# Patient Record
Sex: FEMALE | Race: BLACK OR AFRICAN AMERICAN | Employment: STUDENT | URBAN - METROPOLITAN AREA
[De-identification: names, ages, dates, MRNs, and addresses within clinical notes are randomized per-mention and may not be internally consistent; named-entity substitution may affect disease eponyms.]

---

## 2019-06-10 ENCOUNTER — HOSPITAL ENCOUNTER (OUTPATIENT)
Dept: NUTRITION | Age: 54
Discharge: HOME | End: 2019-06-10
Payer: COMMERCIAL

## 2019-06-10 PROCEDURE — 97802 MEDICAL NUTRITION INDIV IN: CPT

## 2019-06-10 NOTE — PROGRESS NOTES
[address] [place] [address] 51, 45 [facility], [place], 70 [facility]  Phone: [phone]  Fax: [phone]   Nutrition Assessment  Medical Nutrition Therapy   Outpatient Initial Evaluation         Patient Name: Carmita Garcia : 1965   Treatment Diagnosis: diabetes   Referral Source: Marbin Daly* Start of Care [facility]): 6/10/2019     Gender: female Age: 48 y.o. Ht: 70 In Wt: 198.4 lb  kg   BMI: 28.5 BMR   Male  BMR Female 1585     Past Medical History includes: HLD; overweight     Pertinent Medications:   Metformin 750 mg     Biochemical Data:   A1c 8.2; ;      Subjective/Assessment:   Pt stated she was diagnosed with diabetes 4 mos ago (A1c 8.2). She was upset and confused about this diagnosis, since she was never diagnosed with pre diabetes and stated she has been up to date with her yearly routine examinations. Pt also expressed frustration that her efforts to improve her diet and lifestyle were not reflected in f/u labs (A1c increased). Her goal is to avoid taking any medication and to control BGs through diet and exercise. Pt is highly motivated and willing to make the necessary and recommended changes in her lifestyle. Pt works for the [employer] and lives alone, although she has grown children nearby. She stated she was very athletic growing up, but lost interest in exercise while she was going through menopause. Pt showed interest in SMBG and in the CGM to help her better monitor and therefore manage BGs. She is to f/u with PCP for labs the end of July. Current Eating Patterns: Pt stated she recently cut carbs out of her diet, but her dietary recall did not reflect a low carb diet. She c/o increased cravings for sugar. Pt stated she follows a \"healthy\" diet Mon-Thurs, but splurges on the weekends.   Her diet is very inconsistent and she is likely not consuming enough protein to keep her satiated, leading to overeating later in the day and over the weekends. Estimate Needs   Calories: 1400  Protein: 105 Carbs: 140 Fat: 47   Kcal/day  g/day  g/day  g/day        percent: 30  40  30               Education & Recommendations provided: Educated pt on the pathophysiology of Type II Diabetes, insulin resistance and the rationale for dietary modifications and increased activity. Educated pt macronutrient composition of various foods and provided specific recommendations for intake at each scheduled meal and snack. Also discussed the importance of establishing a consistent lifestyle and eating schedule to promote a more efficient metabolism. Handouts Provided: []  Carbohydrates  []  Protein  []  Fiber  []  Serving Sizes  []  Meal and Snack Ideas  []  Food Journals []  Diabetes  []  Cholesterol  []  Sodium  [x]  Meal Builder  [x]  Diet Plan  []  Others:   Information Reviewed with: pt   Readiness to Change Stage: []  Pre-contemplative    []  Contemplative  []  Preparation               [x]  Action                  []  Maintenance   Potential Barriers to Learning: []  Decline in memory    []  Language barrier   []  Other:  []  Emotional                  []  Limited mobility  []  Lack of motivation     [] Vision, hearing or cognitive impairment   Expected Compliance: Good     Nutritional Goal - To promote lifestyle changes to result in:    [x]  Weight loss  [x]  Improved diabetic control  [x]  Decreased cholesterol levels  []  Decreased blood pressure  []  Weight maintenance []  Preventing any interactions associated with food allergies  []  Adequate weight gain toward goal weight  []  Other:        Patient Goals:  SMART goals 1. Always combine and balance carbohydrate and protein        2. Eat 2-3 hrs after snacks and 4-5 hrs after meals  3.  Avoid all added sugars     Dietitian Signature: Mai Birmingham MS, RD, CSSD Date: 6/10/2019   Follow-up: 7/1 at 4:00 Time: 5:16 PM

## 2019-07-01 ENCOUNTER — HOSPITAL ENCOUNTER (OUTPATIENT)
Dept: NUTRITION | Age: 54
Discharge: HOME | End: 2019-07-01
Payer: COMMERCIAL

## 2019-07-01 PROCEDURE — 97803 MED NUTRITION INDIV SUBSEQ: CPT

## 2019-07-01 NOTE — PROGRESS NOTES
NUTRITION  FOLLOW-UP TREATMENT NOTE  Patient Name: Jorge Luis Carter         Date: 2019  : 1965    YES Patient  Verified  Diagnosis: diabetes   In time:   400             Out time:   430   Total Treatment Time (min):   30     SUBJECTIVE/ASSESSMENT    Changes in medication or medical history? Any new allergies, surgeries or procedures?    /NO    If yes, update Summary List           Current Wt: 198.8 Previous Wt: 198.4 Wt Change: +0.4     Achievement of Goals: Pt admitted her diet is not consistent 7 days/wk. She is usually on track M-F, but then rewards herself (with food) over the weekends. Pt's diet recall indicates her overall calorie intake is lower than recommended, as well as her carb intake. She remains fearful of carbohydrates/sugar. Spent most of the appointment discussing the macronutrients and their function in the body. Explained the difference between sugar and carbohydrates and the importance of both. Pt is scheduled for f/u with labs and PCP the end of the month. Patient Education:  [x]  Review current plan with patient   []  Other:    Handouts/  Information Provided: []  Carbohydrates  []  Protein  []  Fiber  []  Serving Sizes  []  Fluids  []  General guidelines []  Diabetes  []  Cholesterol  []  Sodium  []  SBGM  []  Food Journals  []  Others:      New Patient Goals: 1. Measure all starch (after it's cooked); limit to 1/2 cup serving  2. Eat consistently 7 days/wk; keep a food journal; weigh yourself every Mon and Fri  3.  Break meals into smaller snacks, but eat more often, if needed     PLAN    [x]  Continue on current plan []  Follow-up PRN   []  Discharge due to :    [x]  Next appt:  at 4:00     Dietitian: Amparo Cloud MS, RD, CSSD    Date: 2019 Time: 5:41 PM

## 2019-08-30 ENCOUNTER — HOSPITAL ENCOUNTER (OUTPATIENT)
Dept: NUTRITION | Age: 54
Discharge: HOME | End: 2019-08-30
Payer: COMMERCIAL

## 2019-08-30 PROCEDURE — 97803 MED NUTRITION INDIV SUBSEQ: CPT

## 2019-08-30 NOTE — PROGRESS NOTES
NUTRITION  FOLLOW-UP TREATMENT NOTE  Patient Name: Trevor Phillips         Date: 2019  : 1965    YES Patient  Verified  Diagnosis: diabetes   In time:   100             Out time:   130   Total Treatment Time (min):   30     SUBJECTIVE/ASSESSMENT    Changes in medication or medical history? Any new allergies, surgeries or procedures?    /NO    If yes, update Summary List           Current Wt: 191.2 Previous Wt: 196.2 Wt Change: -5     Achievement of Goals: Pt stated she is still struggling to eat as much as recommended. Reminded pt that with consistency and repetition, her appetite will likely build at the recommended times. Also reinforced nutrition label reading and encouraged pt to choose higher calorie sources of carbs and protein which will allow her to eat smaller portions. Pt also struggling with sleep. PCP prescribed ambien, but pt became fearful of developing an addiction, so pt will try melatonin instead. Patient Education:  [x]  Review current plan with patient   []  Other:    Handouts/  Information Provided: []  Carbohydrates  []  Protein  []  Fiber  []  Serving Sizes  []  Fluids  []  General guidelines []  Diabetes  []  Cholesterol  []  Sodium  []  SBGM  []  Food Journals  []  Others:      New Patient Goals: 1. Balance 1 serving protein with 1 serving carb at each scheduled eating time  2. Use meal builder to plan meals/snacks using diet guidelines  3.  Be consistent all 7 days and keep a food log     PLAN    [x]  Continue on current plan []  Follow-up PRN   []  Discharge due to :    [x]  Next appt: Fri oct 4 at 1:00     Dietitian: Raeann Runner, MS, THOMAS, CSSRUPINDER    Date: 2019 Time: 2:33 PM

## 2019-10-15 ENCOUNTER — HOSPITAL ENCOUNTER (OUTPATIENT)
Dept: NUTRITION | Age: 54
Discharge: HOME | End: 2019-10-15
Payer: COMMERCIAL

## 2019-10-15 PROCEDURE — 97803 MED NUTRITION INDIV SUBSEQ: CPT

## 2019-10-21 NOTE — PROGRESS NOTES
NUTRITION  FOLLOW-UP TREATMENT NOTE  Patient Name: Kamilla Robertson         Date: 10/21/2019  : 1965    YES Patient  Verified  Diagnosis:diabetes; nutrition counseling   In time:   200             Out time:   230   Total Treatment Time (min):   30     SUBJECTIVE/ASSESSMENT    Changes in medication or medical history? Any new allergies, surgeries or procedures?    /NO    If yes, update Summary List    Pt in for follow up nutrition/weight loss counseling appt. Pt admitted she continues to feel overwhelmed with stress and sadness. She does not have the energy to meal plan or exercise, and has overindulged in salty snacks as a result of her stress. Pt is anxious to find a job outside of [employer]. Pt stated she no longer enjoys her job here and has no friends or family holding her here. Pt finally ended an 8 year relationship, which has also contributed to her stress. Encouraged pt to look into therapy/mental health counseling for support. Current Wt: 191 Previous Wt: 191.2 Wt Change: -0.2     Achievement of Goals: Pt has not met any of the previous goals. Patient Education:  [x]  Review current plan with patient   []  Other:    Handouts/  Information Provided: []  Carbohydrates  []  Protein  []  Fiber  []  Serving Sizes  []  Fluids  []  General guidelines []  Diabetes  []  Cholesterol  []  Sodium  []  SBGM  []  Food Journals  []  Others:      New Patient Goals: 1. Stay in control- meal plan and prep as necessary  2. Exercise 1d/wk  3.  Measure all starch servings at meals     PLAN    [x]  Continue on current plan []  Follow-up PRN   []  Discharge due to :    [x]  Next appt:  at 2     Dietitian: Bossman Lucas MS, RD, CSSD    Date: 10/21/2019 Time: 10:48 AM

## 2019-12-04 ENCOUNTER — HOSPITAL ENCOUNTER (OUTPATIENT)
Dept: NUTRITION | Age: 54
Discharge: HOME | End: 2019-12-04
Payer: COMMERCIAL

## 2019-12-04 PROCEDURE — 97803 MED NUTRITION INDIV SUBSEQ: CPT

## 2019-12-05 NOTE — PROGRESS NOTES
NUTRITION  FOLLOW-UP TREATMENT NOTE  Patient Name: Demetria Wilson         Date: 2019  : 1965    YES Patient  Verified  Diagnosis: diabetes; In time:   200             Out time:   230   Total Treatment Time (min):   30     SUBJECTIVE/ASSESSMENT    Changes in medication or medical history? Any new allergies, surgeries or procedures? YES/NO    If yes, update Summary List   Pt admitted she over-indulged over the Thanksgiving holiday. Pt rationalized her choices by saying \"I only get to eat this food once a year. \"  She does not appear overly motivated to reach her goals, but knows she needs to make changes in her choices and eating habits to support a healthier lifestyle. Encouraged pt to focus on BG management, rather than eating to lose weight. Pt asked to continue nutrition counseling for the extra accountability. Suggested pt request PCP writes an order for a glucometer to help manage BGs before and after meals. Current Wt: 191.6 Previous Wt: 191 Wt Change: +0.6     Achievement of Goals: 1. Stay in control- meal plan and prep as necessary- not met; cont  2. Exercise 1d/wk- not met; cont  3. Measure all starch servings at meals- not met; cont     Patient Education:  [x]  Review current plan with patient   []  Other:    Handouts/  Information Provided: []  Carbohydrates  []  Protein  []  Fiber  []  Serving Sizes  []  Fluids  []  General guidelines []  Diabetes  []  Cholesterol  []  Sodium  []  SBGM  []  Food Journals  []  Others:      New Patient Goals: 1. Avoid all sweetened beverages including mixed drinks  2. Make appropriate substitutions within each macronutrient  3. Stay in control; distract and delay to avoid impulsive eating  4.  Find yoga/pilates studio     PLAN    [x]  Continue on current plan []  Follow-up PRN   []  Discharge due to :    [x]  Next appt:  at 2     Dietitian: Selwyn Walton MS, RD, CSSD    Date: 2019 Time: 1:33 PM

## 2020-01-08 ENCOUNTER — HOSPITAL ENCOUNTER (OUTPATIENT)
Dept: NUTRITION | Age: 55
Discharge: HOME | End: 2020-01-08
Payer: COMMERCIAL

## 2020-01-08 PROCEDURE — 97803 MED NUTRITION INDIV SUBSEQ: CPT

## 2020-01-13 NOTE — PROGRESS NOTES
NUTRITION  FOLLOW-UP TREATMENT NOTE  Patient Name: Leonor Boateng         Date: 2020  : 1965    YES Patient  Verified  Diagnosis:diabetes   In time:   200             Out time:   230   Total Treatment Time (min):   30     SUBJECTIVE/ASSESSMENT    Changes in medication or medical history? Any new allergies, surgeries or procedures? YES/NO    If yes, update Summary List   Although pt stated she has not made any further progress toward her goals, she continues to lose weight. Pt accepted a new position and will move to [place], [place]. She is very excited about this move, although she does not yet have a start date. Pt celebrated the holidays alone this year and avoided splurging, but reported difficulty maintaining her eating schedule. Pt has not yet begun her own exercise program and plans to hold off until she moves. Pt has lost a total of 9 lbs since . Current Wt: 189.4 Previous Wt: 191.6 Wt Change: -2.2     Achievement of Goals:      Patient Education:  [x]  Review current plan with patient   []  Other:    Handouts/  Information Provided: []  Carbohydrates  []  Protein  []  Fiber  []  Serving Sizes  []  Fluids  []  General guidelines []  Diabetes  []  Cholesterol  []  Sodium  []  SBGM  []  Food Journals  []  Others:      New Patient Goals: 1.  Research yoga/pilates studio locally and try a class     PLAN    [x]  Continue on current plan []  Follow-up PRN   []  Discharge due to :    [x]  Next appt: PRN     Dietitian: Julia Urbina MS, RD, CSSD    Date: 2020 Time: 11:13 AM

## 2020-02-05 ENCOUNTER — HOSPITAL ENCOUNTER (OUTPATIENT)
Dept: NUTRITION | Age: 55
Discharge: HOME | End: 2020-02-05
Payer: COMMERCIAL

## 2020-02-05 PROCEDURE — 97803 MED NUTRITION INDIV SUBSEQ: CPT
